# Patient Record
Sex: MALE | Race: WHITE
[De-identification: names, ages, dates, MRNs, and addresses within clinical notes are randomized per-mention and may not be internally consistent; named-entity substitution may affect disease eponyms.]

---

## 2019-09-10 ENCOUNTER — HOSPITAL ENCOUNTER (EMERGENCY)
Dept: HOSPITAL 97 - ER | Age: 34
Discharge: HOME | End: 2019-09-10
Payer: SELF-PAY

## 2019-09-10 VITALS — DIASTOLIC BLOOD PRESSURE: 71 MMHG | OXYGEN SATURATION: 99 % | SYSTOLIC BLOOD PRESSURE: 124 MMHG

## 2019-09-10 VITALS — TEMPERATURE: 98.9 F
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
## 2019-09-10 DIAGNOSIS — S01.01XA: Primary | ICD-10-CM
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
## 2019-09-10 DIAGNOSIS — Z23: ICD-10-CM

## 2019-09-10 PROCEDURE — 90471 IMMUNIZATION ADMIN: CPT

## 2019-09-10 PROCEDURE — 99284 EMERGENCY DEPT VISIT MOD MDM: CPT

## 2019-09-10 PROCEDURE — 90714 TD VACC NO PRESV 7 YRS+ IM: CPT

## 2019-09-10 PROCEDURE — 72125 CT NECK SPINE W/O DYE: CPT

## 2019-09-10 PROCEDURE — 70450 CT HEAD/BRAIN W/O DYE: CPT

## 2019-09-10 PROCEDURE — 0JQ00ZZ REPAIR SCALP SUBCUTANEOUS TISSUE AND FASCIA, OPEN APPROACH: ICD-10-PCS

## 2019-09-10 NOTE — EDPHYS
Physician Documentation                                                                           

 [facility]                                                                 

Name: Omar Quispe                                                                               

Age: 34 yrs                                                                                       

Sex: Male                                                                                         

: 1985                                                                                   

MRN: [id number]                                                                                   

Arrival Date: 09/10/2019                                                                          

Time: 11:39                                                                                       

Account#: [id number]                                                                            

Bed 15                                                                                            

Private MD:                                                                                       

ED Physician Philippe Renteria                                                                         

HPI:                                                                                              

09/10                                                                                             

11:54 This 34 yrs old  Male presents to ER via Ambulatory with complaints of         jmm 

      Laceration To Head.                                                                         

11:54 The patient or guardian reports injury. The complaints affect the left side of the back jmm 

      of head and right side of the back of head. Onset: The symptoms/episode began/occurred      

      acutely, just prior to arrival. Associated signs and symptoms: Loss of consciousness:       

      This patient did not experience any loss of consciousness. Pertinent negatives: neck        

      pain, shortness of breath, vomiting. Patient states a 55 gallon barrel fell on his head     

      at work. Denies vomiting, LOC, denies pain. Patient unsure on tetanus immunizations         

      status. .                                                                                   

                                                                                                  

Historical:                                                                                       

- Allergies:                                                                                      

11:53 No Known Allergies;                                                                     jl7 

- Home Meds:                                                                                      

11:53 None [Active];                                                                          jl7 

- PMHx:                                                                                           

11:53 Sleep Apnea;                                                                            jl7 

- PSHx:                                                                                           

11:53 None;                                                                                   jl7 

                                                                                                  

- Immunization history:: Adult Immunizations unknown, Last tetanus immunization:                  

  unknown.                                                                                        

- Social history:: Smoking status: Patient/guardian denies using tobacco.                         

- Ebola Screening: : No symptoms or risks identified at this time.                                

                                                                                                  

                                                                                                  

ROS:                                                                                              

11:54 Constitutional: Negative for fever, chills, and weight loss, Cardiovascular: Negative   jmm 

      for chest pain, palpitations, and edema, Respiratory: Negative for shortness of breath,     

      cough, wheezing, and pleuritic chest pain.                                                  

11:54 Skin: Positive for laceration(s).                                                           

11:54 Neuro: Negative for loss of consciousness.                                                  

11:54 All other systems are negative.                                                             

                                                                                                  

Exam:                                                                                             

11:54 Constitutional:  This is a well developed, well nourished patient who is awake, alert,  jmm 

      and in no acute distress.                                                                   

11:54 Eyes:  EOMI, no conjunctival erythema appreciated ENT:  Moist Mucus Membranes Neck:         

      Trachea midline, Supple Chest/axilla:  Normal chest wall appearance and motion.             

      Cardiovascular:  Regular rate and rhythm.  No edema appreciated Respiratory:  Normal        

      respirations, no respiratory distress appreciated Abdomen/GI:  Non distended, soft          

      Back:  Normal ROM                                                                           

11:54 Head/face: 6 cm laceration noted to the posterior scalp.                                    

11:54 Skin: 6 cm laceration noted to the posterior scalp.                                         

11:54 Neuro: Orientation: is normal, Mentation: is normal, Memory: is normal.                     

11:54 Psych: Behavior/mood is pleasant, cooperative.                                              

                                                                                                  

Vital Signs:                                                                                      

11:53  / 80; Pulse 64; Resp 16 S; Temp 98.9(O); Pulse Ox 98% on R/A; Weight 120.2 kg    jl7 

      (R); Pain 2/10;                                                                             

12:56  / 71; Pulse 63; Resp 17; Pulse Ox 99% on R/A;                                    mh5 

                                                                                                  

Laceration:                                                                                       

13:28 Wound Repair of 6cm ( 2.4in ) subcutaneous laceration to posterior scalp. Distal        jmm 

      neuro/vascular/tendon intact. Anesthesia: Local anesthetic administered with 5 mls of       

      1% lidocaine. Wound prep: Moderate cleansing with betadine by me. Skin closed with 5        

      1-0 Staples using staple gun. Patient tolerated well.                                       

                                                                                                  

MDM:                                                                                              

11:54 Patient medically screened.                                                             [facility] 

13:28 Data reviewed: vital signs, nurses notes. Counseling: I had a detailed discussion with  [facility] 

      the patient and/or guardian regarding: the historical points, exam findings, and any        

      diagnostic results supporting the discharge/admit diagnosis, radiology results, the         

      need for outpatient follow up, to return to the emergency department if symptoms worsen     

      or persist or if there are any questions or concerns that arise at home. ED course:         

      Patient given head injury return precautions. Patient understood and agrees with the        

      plan of care. .                                                                             

                                                                                                  

09/10                                                                                             

11:55 Order name: CT Head C Spine; Complete Time: 13:28                                       [facility] 

                                                                                                  

Administered Medications:                                                                         

12:31 Drug: Tetanus-Diphtheria Toxoid Adult 0.5 ml {: [employer]. Exp:         jl7 

      2021. Lot #: [id number]. } Route: IM; Site: right deltoid;                                 

12:46 Follow up: Response: No adverse reaction                                                 

13:06 Drug: Lidocaine (1 %) 5 mg {Note: administered by DHRUV Blevins} Volume: 5 ml; Route:       jl7 

      Infiltration;                                                                               

                                                                                                  

                                                                                                  

Disposition:                                                                                      

14:45 Co-signature as Attending Physician, Philippe Renteria MD.                                    rn  

                                                                                                  

Disposition:                                                                                      

09/10/19 13:30 Discharged to Home. Impression: Unspecified injury of head, Scalp Laceration.      

- Condition is Stable.                                                                            

- Discharge Instructions: Head Injury, Adult, Facial Laceration.                                  

                                                                                                  

- Medication Reconciliation Form, Thank You Letter, Antibiotic Education, Prescription            

  Opioid Use, Work release form form.                                                             

- Follow up: Private Physician; When: 5 - 6 days; Reason: Recheck today's complaints,             

  Continuance of care, Staple/Suture removal, Re-evaluation by your physician.                    

                                                                                                  

                                                                                                  

                                                                                                  

Signatures:                                                                                       

Dispatcher [employer]                           EDGen Cervantes PA PA jmm Nieto, Roman, MD MD   rn                                                   

Jas Hodges RN                        RN   jl7                                                  

                                                                                                  

Corrections: (The following items were deleted from the chart)                                    

13:50 13:30 09/10/2019 13:30 Discharged to Home. Impression: Unspecified injury of head;      jl7 

      Scalp Laceration. Condition is Stable. Forms are Medication Reconciliation Form, Thank      

      You Letter, Antibiotic Education, Prescription Opioid Use. Follow up: Private               

      Physician; When: 5 - 6 days; Reason: Recheck today's complaints, Continuance of care,       

      Staple/Suture removal, Re-evaluation by your physician. [email or web address]                                 

                                                                                                  

**************************************************************************************************

## 2019-09-10 NOTE — RAD REPORT
EXAM DESCRIPTION:  CT - [id number] - 9/10/2019 12:08 pm

 

CLINICAL HISTORY:  Blunt force trauma, head and neck injury

 

COMPARISON:  None.

 

TECHNIQUE:  Axial 5 mm thick images of the head were obtained.  Axial 2 mm thick images of the cervic
al spine were obtained with sagittal and coronal reconstruction images generated and reviewed.

 

All CT scans are performed using dose optimization technique as appropriate and may include automated
 exposure control or mA/KV adjustment according to patient size.

 

FINDINGS:  No intracranial hemorrhage, mass, edema or acute intracranial finding. No cortical edema o
r sulcal effacement. No extra-axial fluid collections. Mastoid air cells and paranasal sinuses are cl
ear. No globe or orbit abnormality seen. No skull fracture. No scalp hematoma identified.

 

Cervical body height and alignment are normal. No disk space narrowing. No fracture or acute bony abn
ormality.

 

No paraspinal mass or hematoma.

 

IMPRESSION:  Negative CT head examination for acute or significant finding.

 

Negative CT cervical spine examination for acute or significant finding.

## 2019-09-10 NOTE — ER
Nurse's Notes                                                                                     

 [facility]                                                                 

Name: Omar Quispe                                                                               

Age: 34 yrs                                                                                       

Sex: Male                                                                                         

: 1985                                                                                   

MRN: [id number]                                                                                   

Arrival Date: 09/10/2019                                                                          

Time: 11:39                                                                                       

Account#: [id number]                                                                            

Bed 15                                                                                            

Private MD:                                                                                       

Diagnosis: Unspecified injury of head;Scalp Laceration                                            

                                                                                                  

Presentation:                                                                                     

09/10                                                                                             

11:51 Presenting complaint: Patient states: At work and 55 gallon drum hit back of head,      jl7 

      denies LOC. Transition of care: patient was not received from another setting of care.      

      Complicating Factors: There are no complicating factors for this patient. Onset of          

      symptoms was September 10, 2019 at 11:20. Risk Assessment: Do you want to hurt yourself     

      or someone else? Patient reports no desire to harm self or others. Initial Sepsis           

      Screen: Does the patient meet any 2 criteria? No. Patient's initial sepsis screen is        

      negative. Does the patient have a suspected source of infection? No. Patient's initial      

      sepsis screen is negative. Care prior to arrival: None.                                     

11:51 Method Of Arrival: Ambulatory                                                           [facility] 

11:51 Acuity: ELENA 4                                                                           jl7 

                                                                                                  

Triage Assessment:                                                                                

11:53 General: Appears in no apparent distress. uncomfortable, Behavior is calm, cooperative, jl7 

      appropriate for age. Pain: Complains of pain in posterior scalp Pain currently is 2 out     

      of 10 on a pain scale. Neuro: Level of Consciousness is awake, alert, obeys commands,       

      Oriented to person, place, time, situation, Gait is steady, Speech is normal, Pupils        

      are PERRLA. Cardiovascular: Patient's skin is warm and dry. Respiratory: Airway is          

      patent Respiratory effort is even, unlabored, Respiratory pattern is regular,               

      symmetrical. Derm: Skin is pink, warm \T\ dry. Injury Description: Laceration sustained     

      to posterior scalp is 2.6 to 7.5 cm long, was sustained 30-60 minutes ago. is bleeding      

      a small amount.                                                                             

                                                                                                  

Historical:                                                                                       

- Allergies:                                                                                      

11:53 No Known Allergies;                                                                     jl7 

- Home Meds:                                                                                      

11:53 None [Active];                                                                          jl7 

- PMHx:                                                                                           

11:53 Sleep Apnea;                                                                            jl7 

- PSHx:                                                                                           

11:53 None;                                                                                   jl7 

                                                                                                  

- Immunization history:: Adult Immunizations unknown, Last tetanus immunization:                  

  unknown.                                                                                        

- Social history:: Smoking status: Patient/guardian denies using tobacco.                         

- Ebola Screening: : No symptoms or risks identified at this time.                                

                                                                                                  

                                                                                                  

Screenin:00 Abuse screen: Denies threats or abuse. Denies injuries from another. Nutritional        jl7 

      screening: No deficits noted. Tuberculosis screening: No symptoms or risk factors           

      identified. Fall Risk None identified.                                                      

                                                                                                  

Assessment:                                                                                       

12:00 General: See triage assessment. Musculoskeletal: No signs and/or symptoms reported      jl7 

      regarding the musculoskeletal system. Injury Description: Laceration sustained to           

      posterior scalp is 2.6 to 7.5 cm long, was sustained 30-60 minutes ago. is bleeding a       

      small amount.                                                                               

13:00 Reassessment: Patient appears in no apparent distress at this time. Patient and/or      jl7 

      family updated on plan of care and expected duration. Pain level reassessed. Patient is     

      alert, oriented x 3, equal unlabored respirations, skin warm/dry/pink.                      

                                                                                                  

Vital Signs:                                                                                      

11:53  / 80; Pulse 64; Resp 16 S; Temp 98.9(O); Pulse Ox 98% on R/A; Weight 120.2 kg    jl7 

      (R); Pain 2/10;                                                                             

12:56  / 71; Pulse 63; Resp 17; Pulse Ox 99% on R/A;                                    mh5 

                                                                                                  

ED Course:                                                                                        

11:39 Patient arrived in ED.                                                                  rg4 

11:44 Jas Hodges, RN is Primary Nurse.                                                      jl7 

11:46 Gen Crawley PA is PHCP.                                                              [facility] 

11:46 Philippe Renteria MD is Attending Physician.                                                [facility] 

11:53 Triage completed.                                                                       jl7 

11:53 Arm band placed on right wrist. Bandage applied.                                        jl7 

12:39 CT Head C Spine In Process Unspecified.                                                 EDMS

12:56 Patient has correct armband on for positive identification. Bed in low position. Call   5 

      light in reach. Pulse ox on. NIBP on.                                                       

13:00 Assist provider with laceration repair on back of head that was between 2.6 to 7.5 cm   jl7 

      using staples. Set up tray. Performed by Gen VELAZQUEZ Dressed with 4X4s, Patient         

      tolerated well. Patient did not have IV access during this emergency room visit.            

                                                                                                  

Administered Medications:                                                                         

12:31 Drug: Tetanus-Diphtheria Toxoid Adult 0.5 ml {: [employer]. Exp:         jl7 

      2021. Lot #: [id number]. } Route: IM; Site: right deltoid;                                 

12:46 Follow up: Response: No adverse reaction                                                jl7 

13:06 Drug: Lidocaine (1 %) 5 mg {Note: administered by CAROLINE Blevins.} Volume: 5 ml; Route:       jl7 

      Infiltration;                                                                               

                                                                                                  

                                                                                                  

Outcome:                                                                                          

13:30 Discharge ordered by MD. r[place]dale 

13:49 Discharged to home ambulatory.                                                          jl7 

13:50 Condition: stable                                                                       jl7 

13:50 Discharge instructions given to patient, Instructed on discharge instructions, follow       

      up and referral plans. Demonstrated understanding of instructions, follow-up care.          

13:50 Patient left the ED.                                                                    jl7 

                                                                                                  

Signatures:                                                                                       

Dispatcher [employer]                           EDMS                                                 

Gen Crawley PA PA jmm Garcia, Rubi                                 4                                                  

Carly Oneal                              [facility]                                                  

Jas Hodges RN                        RN   jl7                                                  

                                                                                                  

Corrections: (The following items were deleted from the chart)                                    

13:50 13:00 Discharged to home ambulatory, jl7                                                jl7 

13:50 13:00 Condition: stable jl7                                                             jl7 

13:50 13:00 Discharge instructions given to patient, Instructed on discharge instructions,    jl7 

      follow up and referral plans. Demonstrated understanding of instructions, follow-up         

      care, jl7                                                                                   

                                                                                                  

**************************************************************************************************

## 2019-09-16 ENCOUNTER — HOSPITAL ENCOUNTER (EMERGENCY)
Dept: HOSPITAL 97 - ER | Age: 34
Discharge: HOME | End: 2019-09-16
Payer: SELF-PAY

## 2019-09-16 VITALS — OXYGEN SATURATION: 96 % | SYSTOLIC BLOOD PRESSURE: 132 MMHG | DIASTOLIC BLOOD PRESSURE: 68 MMHG | TEMPERATURE: 97.9 F

## 2019-09-16 DIAGNOSIS — Z48.02: Primary | ICD-10-CM

## 2019-09-16 PROCEDURE — 99281 EMR DPT VST MAYX REQ PHY/QHP: CPT

## 2019-09-16 NOTE — EDPHYS
Physician Documentation                                                                           

 [facility]                                                                 

Name: Omar Quispe                                                                               

Age: 34 yrs                                                                                       

Sex: Male                                                                                         

: 1985                                                                                   

MRN: [id number]                                                                                   

Arrival Date: 2019                                                                          

Time: 16:57                                                                                       

Account#: [id number]                                                                            

Bed Waiting                                                                                       

Private MD:                                                                                       

RUSSEL Physician Gavin Rodriguez                                                                      

HPI:                                                                                              

                                                                                             

06:43 This 34 yrs old  Male presents to ER via Ambulatory with complaints of Staple  snw 

      Removal.                                                                                    

06:43 The patient has staples on the right parietal area. Previous treatment: The patient was snw 

      initially treated 7 day(s) ago, the care was rendered at [facility]. Sutures/staples progress: The patient's wound displays overriding edges. It is      

      unknown whether or not the patient has had similar symptoms in the past. as noted.          

                                                                                                  

Historical:                                                                                       

- Allergies:                                                                                      

                                                                                             

17:02 No Known Allergies;                                                                     sv  

- PMHx:                                                                                           

17:02 Sleep Apnea;                                                                            sv  

                                                                                                  

- Immunization history:: Adult Immunizations up to date.                                          

- Social history:: Smoking status: Patient/guardian denies using tobacco.                         

- Ebola Screening: : No symptoms or risks identified at this time.                                

                                                                                                  

                                                                                                  

ROS:                                                                                              

                                                                                             

06:40 Eyes: Negative for injury, pain, redness, and discharge, ENT: Negative for injury,      snw 

      pain, and discharge, Neck: Negative for injury, pain, and swelling, Cardiovascular:         

      Negative for chest pain, palpitations, and edema, Respiratory: Negative for shortness       

      of breath, cough, wheezing, and pleuritic chest pain, Abdomen/GI: Negative for              

      abdominal pain, nausea, vomiting, diarrhea, and constipation, Back: Negative for injury     

      and pain, : Negative for injury, bleeding, discharge, and swelling, MS/Extremity:         

      Negative for injury and deformity, Skin: Negative for injury, rash, and discoloration,      

      Neuro: Negative for headache, weakness, numbness, tingling, and seizure, Psych:             

      Negative for depression, anxiety, suicide ideation, homicidal ideation, and                 

      hallucinations.                                                                             

      Constitutional: Positive for no c/o, requests staple removal.                               

                                                                                                  

Exam:                                                                                             

06:40 Constitutional:  This is a well developed, well nourished patient who is awake, alert,  snw 

      and in no acute distress. Eyes:  Pupils equal round and reactive to light, extra-ocular     

      motions intact.  Lids and lashes normal.  Conjunctiva and sclera are non-icteric and        

      not injected.  Cornea within normal limits.  Periorbital areas with no swelling,            

      redness, or edema. ENT:  Nares patent. No nasal discharge, no septal abnormalities          

      noted.  Tympanic membranes are normal and external auditory canals are clear.               

      Oropharynx with no redness, swelling, or masses, exudates, or evidence of obstruction,      

      uvula midline.  Mucous membranes moist. Neck:  Trachea midline, no thyromegaly or           

      masses palpated, and no cervical lymphadenopathy.  Supple, full range of motion without     

      nuchal rigidity, or vertebral point tenderness.  No Meningismus. Chest/axilla:  Normal      

      chest wall appearance and motion.  Nontender with no deformity.  No lesions are             

      appreciated. Cardiovascular:  Regular rate and rhythm with a normal S1 and S2.  No          

      gallops, murmurs, or rubs.  Normal PMI, no JVD.  No pulse deficits. Respiratory:  Lungs     

      have equal breath sounds bilaterally, clear to auscultation and percussion.  No rales,      

      rhonchi or wheezes noted.  No increased work of breathing, no retractions or nasal          

      flaring. Abdomen/GI:  Soft, non-tender, with normal bowel sounds.  No distension or         

      tympany.  No guarding or rebound.  No evidence of tenderness throughout. Back:  No          

      spinal tenderness.  No costovertebral tenderness.  Full range of motion. Skin:  Warm,       

      dry with normal turgor.  Normal color with no rashes, no lesions, and no evidence of        

      cellulitis.                                                                                 

06:40 Head/face: Noted is staples x 6 to occiput, wound edges over-riding, still loosely          

      approximated. Cleansed with Hibiclens.                                                      

                                                                                                  

Vital Signs:                                                                                      

                                                                                             

17:02  / 68; Pulse 67; Resp 16; Temp 97.9; Pulse Ox 96% ;                               sv  

                                                                                                  

MDM:                                                                                              

17:13 Patient medically screened.                                                             snw 

                                                                                             

06:42 Data reviewed: vital signs, nurses notes. Data interpreted: Pulse oximetry: on room air snw 

      is 96 %. Interpretation: acceptable. Counseling: I had a detailed discussion with the       

      patient and/or guardian regarding: the historical points, exam findings, and any            

      diagnostic results supporting the discharge/admit diagnosis, to return to the emergency     

      department if symptoms worsen or persist or if there are any questions or concerns that     

      arise at home. Response to treatment: the patient's symptoms have markedly improved         

      after treatment. Special discussion: I discussed in detail with the patient the higher      

      chance of wound infection based on his presenting history. Based on the history and         

      exam findings, there is no indication for further emergent testing or inpatient             

      evaluation. I discussed with the patient/guardian the need to see the primary care          

      provider for further evaluation of the symptoms.                                            

                                                                                                  

Administered Medications:                                                                         

No medications were administered                                                                  

                                                                                                  

                                                                                                  

Disposition:                                                                                      

07:38 Co-signature as Attending Physician, Gavin Rodriguez MD I agree with the assessment and  tali 

      plan of care.                                                                               

                                                                                                  

Disposition:                                                                                      

19 17:13 Discharged to Home. Impression: Encounter for removal of sutures.                  

- Condition is Stable.                                                                            

- Discharge Instructions: How to Change Your Dressing, Suture Removal, Care After,                

  Incision Care, Adult, Wound Closure Removal.                                                    

                                                                                                  

- Medication Reconciliation Form, Thank You Letter, Antibiotic Education, Prescription            

  Opioid Use form.                                                                                

- Follow up: Private Physician; When: 2 - 3 days; Reason: Recheck today's complaints,             

  Continuance of care, Re-evaluation by your physician. Follow up: Emergency                      

  Department; When: As needed; Reason: Worsening of condition.                                    

                                                                                                  

                                                                                                  

                                                                                                  

Signatures:                                                                                       

Savannah Cloud RN RN sv Anderson, Corey, MD MD cha Therrien, Shelly, FNP-C                 FNP-Csnw                                                  

                                                                                                  

Corrections: (The following items were deleted from the chart)                                    

                                                                                             

17:17 17:13 2019 17:13 Discharged to Home. Impression: Encounter for removal of         sv  

      sutures. Condition is Stable. Forms are Medication Reconciliation Form, Thank You           

      Letter, Antibiotic Education, Prescription Opioid Use. Follow up: Private Physician;        

      When: 2 - 3 days; Reason: Recheck today's complaints, Continuance of care,                  

      Re-evaluation by your physician. Follow up: Emergency Department; When: As needed;          

      Reason: Worsening of condition. snw                                                         

                                                                                                  

**************************************************************************************************

## 2019-09-16 NOTE — XMS REPORT
Summary of Care: 17 - 17

 Created on:2122



Patient:LORENA MORALES

Sex:Male

:1985

External Reference #:[id number]





Demographics







 Address  [address] [place] [address]-

 

 Home Phone  [phone]

 

 Email Address  [email or web address]

 

 Preferred Language  English

 

 Marital Status  Single

 

 Jainism Affiliation  Unknown

 

 Race  White/

 

 Ethnic Group  Not  or 









Author







 Organization  [facility]

 

 Address  [address] [place] [address]-

 

 Phone  [phone]









Encounter

HQ Encntr_alias(FIN) [id number] Date(s): 17 - 17

[facility] [address] 
[id number]- [phone]

Discharge Disposition: Home or Self Care

Attending Physician: Milagros Gamez NP





Vital Signs

No data available for this section



Problem List







 Condition  Effective Dates  Status  Health Status  Informant

 

 Asthma(Confirmed)    Active    

 

 Chronic urticaria(Confirmed)    Active    

 

 Mixed hyperlipidemia(Confirmed)    Active    

 

 Morbid obesity(Confirmed)    Active    

 

 Sleep apnea(Confirmed)    Active    







Allergies, Adverse Reactions, Alerts







 Substance  Reaction  Severity  Status

 

 NKDA      Active







Medications

No data available for this section



Results

No data available for this section



Immunizations

No data available for this section



Procedures

No data available for this section



Social History







 Social History Type  Response

 

 Smoking Status  Never smoker; Exposure to Tobacco Smoke None; Cigarette Smoking



   Last 365 Days No; Reg Smoking Cessation Counseling No







Assessment and Plan

No data available for this section

## 2019-09-16 NOTE — XMS REPORT
Summary of Care

 Created on:2019



Patient:Omar Quispe

Sex:Male

:1985

External Reference #:[id number]





Demographics







 Address  [address] [place] [address]

 

 Home Phone  [phone]

 

 Preferred Language  English

 

 Marital Status  

 

 Jain Affiliation  Unknown

 

 Race  White

 

 Ethnic Group  Not  or 









Author







 Organization  [facility]

 

 Address  [address] [place] [address]









Support







 Name  Relationship  Address  Phone

 

 Dexter Quispe  Unavailable  Unavailable  [phone]









Care Team Providers







 Name  Role  Phone

 

 Saud Duomnt MD  Primary Care Provider  [phone]









Reason for Visit







 Reason  Comments

 

 New Patient  



 (ASAP)





 Status  Reason  Specialty  Diagnoses /  Referred By  Referred To



       Procedures  Contact  Contact

 

 Authorized    Orthopedic Surgery  Diagnoses



Paronychia of great toe, right



Injury of toenail of right foot, initial encounter  Nasir Martinez  



       



Procedures



CONSULT/REFERRAL PODIATRY  [facility]



  



         [address] [place]  



         [address]



  



         Phone:  



         [phone]



  



         Fax:  



         [phone]  









Encounter Details







 Date  Type  Department  Care Team  Description

 

 2019  Office Visit  [facility]  Panchbhavi,  Plantar 
fasciitis



     Surgery- Abigail Barr MD



  (Primary Dx)



     [facility]



  [address] [place] [address]  



     [address]



  [address] [place] [address] [phone]  



       (Fax)  







Allergies







 Active Allergy  Reactions  Severity  Noted Date  Comments

 

 Testosterone  Nausea and/or Vomiting    2019  



documented as of this encounter (statuses as of 2019)



Medications







 Medication  Sig  Dispensed  Refills  Start Date  End Date  Status

 

 mupirocin 2 %  Apply  to area(s)  22 g  0  2019    Active



 ointmentIndications:  3 (three) times          



 Paronychia of great  daily.          



 toe, right            



documented as of this encounter (statuses as of 2019)



Active Problems

No known active problemsdocumented as of this encounter (statuses as of 2019)



Social History







 Tobacco Use  Types  Packs/Day  Years Used  Date

 

 Unknown If Ever Smoked        









 Smokeless Tobacco: Never Used      









 Sex Assigned at Birth  Date Recorded

 

 Not on file  









 Job Start Date  Occupation  Industry

 

 Not on file  Not on file  Not on file









 Travel History  Travel Start  Travel End









 No recent travel history available.



documented as of this encounter



Last Filed Vital Signs







 Vital Sign  Reading  Time Taken  Comments

 

 Blood Pressure  -  -  

 

 Pulse  -  -  

 

 Temperature  -  -  

 

 Respiratory Rate  -  -  

 

 Oxygen Saturation  -  -  

 

 Inhaled Oxygen Concentration  -  -  

 

 Weight  120.4 kg (265 lb 6.4 oz)  2019  3:01 PM  



     CDT  

 

 Height  -  -  

 

 Body Mass Index  41.57  2019  6:13 PM  



     CDT  



documented in this encounter



Progress Notes

Efraín Doe,  - 2019  2:20 PM CDTFormatting of this note might 
be different from the original.

ORTHOPAEDIC CLINIC NOTE



Patient: Omar Quispe

Patient Age: 34 year old

Gender: male



SUBJECTIVE:



CC: right "loose" toenail



HISTORY OF PRESENT ILLNESS

Omar Quispe is a 34 year old male who presents with right sided loose 
toenail after hitting it on a rock about 3 weeks ago. He also complaints of 
bilateral plantar tenderness that is worse in the morning when he takes his 
first few steps



Lives in [address] [place] [address].



PAST HISTORIES

No significant PMH

NO significant PSH



MEDICATIONS

Current Outpatient Medications

Medication Sig Dispense Refill

 mupirocin 2 % ointment Apply  to area(s) 3 (three) times daily. 22 g 0



No current facility-administered medications for this visit.



ALLERGIES

Allergies

Allergen Reactions

 Androgel [Testosterone] Nausea and/or Vomiting



SOCIAL

Social History



Occupational History

 Not on file

Tobacco Use

 Smoking status: Unknown If Ever Smoked

 Smokeless tobacco: Never Used

Substance and Sexual Activity

 Alcohol use: Not on file

 Drug use: Not on file

 Sexual activity: Not on file



REVIEW OF SYSTEMS

Pertinent ROM can be found in HPI



OBJECTIVE:



PHYSICAL EXAMINATION

CONSTITUTIONAL:

Wt 120.4 kg (265 lb 6.4 oz)  | BMI 41.57 kg/m

Constitutional: No acute distress, appears adequately nurished

Eyes: extraocular movements intact, conjunctivae pink

ENMT: normal hearing, trachea midline, ears symmetrical

CV: normal peripheral perfusion, regular rate

Respiratory: breaths nonlabored, symmetrical chest expansion, no respiratory 
distress

GI: abdomen non-distended, no gross deformity

Skin: warm and dry, no pallor

Neurologic: no focal deficits, vocalizing demands

Psychiatric: A&amp;OX3; Appropriate affect

Musculoskeletal:

Loose 1st toe nail.

Grossly NV intact

Motor intact to EHL and FHL.

Minimal to no tenderness

TTP over the



IMAGING

Xray imaging reviewed and supports the diagnosis below



LABS

Laboratory analysis not indicated for current encounter



 Independently reviewed current and previous imaging for comparison, if 
applicable, as well as relevant previous or current lab results. Physical exam 
finding have been adequately explained to the patient



ASSESSMENT AND PLAN



ASSESSMENT

Omar Quispe is a 34 year old /White male  with loose toenail on 
the right foot; no concern for paronychia; Bilateral plantar fasciitis



PLAN:

We have discussed all the treatment options and have agreed upon:



-no concern for infection; may allow nail to fall off naturally; new nail with 
replace the current nail

-Follow up PRN

-Wall stretching exercise for calf stretching

-Cushioned heel pads

-Ball roll stretching exercises discussed with patient

-OTC pain medication for acute flares of pain

-Recommend changing shoes for work every three months



Spent time going over patient's symptoms, physical exam findings, imaging 
findings, and treatment plan.I discussed my findings and educated patient on 
the condition present. All questions were answeredappropriately and to the 
patient's satisfaction.



Efraín Doe DO

Orthopaedics Department

PGY-2 Pager: [phone]

2019





Electronically signed by Efraín Doe DO at 2019  3:13 PM 
Reanna Loving - 2019  2:20 PM Donovan BETO Quispe is a 34 year old 
male comes to clinic independent in ambulation as a new patient.

Pt comes alone.

No acute distress noted w/ pain reported 0/10.

Pt preferred language is English.

Pt. denies fall in last 12 months.

Allergies and medications were reviewed.

Electronically signed by Reanna Esparza at 2019  3:02 PM CDTdocumented 
in this encounter



Plan of Treatment







 Health Maintenance  Due Date  Last Done  Comments

 

 VARICELLA VACCINES (1 of 2 - 13+  1998    



 2-dose series)      

 

 DTaP,Tdap,and Td Vaccines ( -  2004    



 Tdap)      

 

 INFLUENZA VACCINE (#1)  2019    

 

 PNEUMOCOCCAL 0-64 YEARS COMBINED  Aged Out    No longer eligible based on



 SERIES      patient's age to complete this



       topic



documented as of this encounter



Results

Not on filedocumented in this encounter



Visit Diagnoses







 Diagnosis

 

 Plantar fasciitis - Primary







 Plantar fascial fibromatosis



documented in this encounter



Insurance







 Payer  Benefit Plan / Group  Subscriber ID  Effective Dates  Phone  Address  
Type

 

 [facility] [employer]  [facility] [employer]  [id number]  2019-Present      PPO









 Guarantor Name  Account Type  Relation to  Date of  Phone  Billing Address



     Patient  Birth    

 

 Omar Quispe  Personal/Famil  Self  1985  [phone]  [address] 
[facility]      ([place] [address]



documented as of this encounter

## 2019-09-16 NOTE — XMS REPORT
Summary of Care

 Created on:2019



Patient:Omar Quispe

Sex:Male

:1985

External Reference #:[id number]





Demographics







 Address  [address] [place] [address]

 

 Home Phone  [phone]

 

 Preferred Language  English

 

 Marital Status  

 

 Yarsanism Affiliation  Unknown

 

 Race  White

 

 Ethnic Group  Not  or 









Author







 Organization  [facility]

 

 Address  [address] [place] [address]









Support







 Name  Relationship  Address  Phone

 

 Dexter Quispe  Unavailable  Unavailable  [phone]









Care Team Providers







 Name  Role  Phone

 

 Saud Dumont MD  Primary Care Provider  [phone]









Reason for Referral

 (ASAP)





 Status  Reason  Specialty  Diagnoses /  Referred By  Referred To



       Procedures  Contact  Contact

 

 New Request    Orthopedic Surgery  Diagnoses



Paronychia of great toe, right



Injury of toenail of right foot, initial encounter  Nasir Martinez  



       



Procedures



CONSULT/REFERRAL PODIATRY  MD ISMAEL



  



         [address] [place] [address]  



         [address]



  



         Phone:  



         [phone]



  



         Fax:  



         [phone]  









Reason for Visit







 Reason  Comments

 

 Toe Pain  RT great toe - hit rock while tubing and nail is lifted







Encounter Details







 Date  Type  Department  Care Team  Description

 

 2019  Urgent Care  [facility]  Unknown, Attending  Paronychia 
of great toe, right (Primary Dx);



     and Adult Primary  



Nasir Martinez MD



[address] [place] [address] [phone] (Fax)  Injury of toenail of right foot, initial encounter



     [address] [address] [phone]    







Allergies







 Active Allergy  Reactions  Severity  Noted Date  Comments

 

 Testosterone  Nausea and/or Vomiting    2019  



documented as of this encounter (statuses as of 2019)



Medications







 Medication  Sig  Dispensed  Refills  Start Date  End Date  Status

 

 sulfamethoxazole-trim  Take 1 tablet by  20 tablet  0  2019  
Active



 ethoprim (BACTRIM DS)  mouth 2 (two)          



 800-160 mg per  times daily for          



 tabletIndications:  10 days.          



 Paronychia of great            



 toe, right            

 

 mupirocin 2 %  Apply  to area(s)  22 g  0  2019    Active



 ointmentIndications:  3 (three) times          



 Paronychia of great  daily.          



 toe, right            



documented as of this encounter (statuses as of 2019)



Active Problems

No known active problemsdocumented as of this encounter (statuses as of 2019)



Social History







 Tobacco Use  Types  Packs/Day  Years Used  Date

 

 Unknown If Ever Smoked        









 Smokeless Tobacco: Never Used      









 Sex Assigned at Birth  Date Recorded

 

 Not on file  









 Job Start Date  Occupation  Industry

 

 Not on file  Not on file  Not on file









 Travel History  Travel Start  Travel End









 No recent travel history available.



documented as of this encounter



Last Filed Vital Signs







 Vital Sign  Reading  Time Taken  Comments

 

 Blood Pressure  125/81  2019  6:13 PM  



     CDT  

 

 Pulse  66  2019  6:13 PM  



     CDT  

 

 Temperature  36.8 C (98.3 F)  2019  6:13 PM  



     CDT  

 

 Respiratory Rate  18  2019  6:13 PM  



     CDT  

 

 Oxygen Saturation  97%  2019  6:13 PM  



     CDT  

 

 Inhaled Oxygen Concentration  -  -  

 

 Weight  121.1 kg (266 lb 14.4 oz)  2019  6:13 PM  



     CDT  

 

 Height  170.2 cm (5' 7")  2019  6:13 PM  



     CDT  

 

 Body Mass Index  41.8  2019  6:13 PM  



     CDT  



documented in this encounter



Patient Instructions

Patient InstructionsWhNasir jasso MD - 2019  6:15 PM CDTsupportive 
care with plenty of fluids; tylenol or motrin prn. F/u with PCP within 1 week 
or sooner if symptoms not improved or worsen.

Thank you.Electronically signed by Nasir Martinez MD at 2019  6:58 PM 
CDT

documented in this encounter



Progress Notes

Nasir Martinez MD - 2019  6:15 PM CDTFormatting of this note might be 
different from the original.

Cc: right great toe injury



Chief Complaint

Patient presents with

 Toe Pain

  RT great toe - hit rock while tubing and nail is lifted



Omar Quispe is a 34 year old male.

Patient reports right great toe injury, along with toenail injury that occurred 
recently while patient was tubing in the [facility]. Patient reports pain
, swelling, and redness to right great toe, along with partial nail avulsion. 
Patient denies numbness or weakness. Some pain with ambulation also reported.



Allergies

Omar is allergic to androgel [testosterone].



Medications

No outpatient medications prior to visit.



No facility-administered medications prior to visit.



Histories

History reviewed. No pertinent past medical history.

History reviewed. No pertinent surgical history.

Social History



Socioeconomic History

 Marital status: 

  Spouse name: Not on file

 Number of children: Not on file

 Years of education: Not on file

 Highest education level: Not on file

Occupational History

 Not on file

Social Needs

 Financial resource strain: Not on file

 Food insecurity:

  Worry: Not on file

  Inability: Not on file

 Transportation needs:

  Medical: Not on file

  Non-medical: Not on file

Tobacco Use

 Smoking status: Unknown If Ever Smoked

 Smokeless tobacco: Never Used

Substance and Sexual Activity

 Alcohol use: Not on file

 Drug use: Not on file

 Sexual activity: Not on file

Lifestyle

 Physical activity:

  Days per week: Not on file

  Minutes per session: Not on file

 Stress: Not on file

Relationships

 Social connections:

  Talks on phone: Not on file

  Gets together: Not on file

  Attends Yazidi service: Not on file

  Active member of club or organization: Not on file

  Attends meetings of clubs or organizations: Not on file

  Relationship status: Not on file

 Intimate partner violence:

  Fear of current or ex partner: Not on file

  Emotionally abused: Not on file

  Physically abused: Not on file

  Forced sexual activity: Not on file

Other Topics Concern

 Not on file

Social History Narrative

 Not on file



History reviewed. No pertinent family history.



Review of Systems

Constitutional: Negative for activity change, appetite change, chills, 
diaphoresis, fatigue and fever.

HENT: Negative for congestion, dental problem, rhinorrhea, sinus pressure and 
sore throat.

Eyes: Negative for pain and visual disturbance.

Respiratory: Negative for cough, shortness of breath, wheezing and stridor.

Cardiovascular: Negative for chest pain and palpitations.

Gastrointestinal: Negative for abdominal distention, constipation, diarrhea, 
nausea and vomiting.

Genitourinary: Negative for dysuria and frequency.

Musculoskeletal: Negative for arthralgias, back pain and neck pain.

Skin: Positive for color change. Negative for pallor.

Neurological: Negative for dizziness, tremors, weakness, light-headedness, 
numbness and headaches.

Hematological: Negative for adenopathy.



Vital Signs

/81 (BP Location: Left arm, Patient Position: Sitting, BP CUFF SIZE: 
Adult Large)  | Pulse 66| Temp 36.8 C (98.3 F) (Oral)  | Resp 18  | Ht 5' 7
" (1.702 m)  | Wt 266 lb 14.4 oz (121.1 kg)  | SpO2 97%  | BMI 41.80 kg/m



Physical Exam

Constitutional: He is oriented to person, place, and time. He appears well-
developed and well-nourished. No distress.

HENT:

Head: Normocephalic and atraumatic.

Right Ear: External ear normal.

Left Ear: External ear normal.

Eyes: Pupils are equal, round, and reactive to light. Conjunctivae and EOM are 
normal. Right eye exhibits no discharge. Left eye exhibits no discharge.

Neck: Normal range of motion. Neck supple. No tracheal deviation present.

Cardiovascular: Normal rate and intact distal pulses.

Pulmonary/Chest: Effort normal. No respiratory distress.

Abdominal: Soft. He exhibits no distension.

Musculoskeletal: Normal range of motion. He exhibits edema and tenderness. He 
exhibits no deformity.

Neurological: He is alert and oriented to person, place, and time. He has 
normal reflexes. No cranial nerve deficit or sensory deficit. He exhibits 
normal muscle tone.

Skin: Skin is warm and dry. Capillary refill takes less than 2 seconds. He is 
not diaphoretic. Thereis erythema. No pallor.

Psychiatric: He has a normal mood and affect. His behavior is normal.

Nursing note and vitals reviewed.



Assessment/Plan



Paronychia of great toe, right  (primary encounter diagnosis)

Comment: right great toe paronychia and partly avulsed nail plate secondary to 
right great toe injury. Good distal pulses and capillary refill. Patient 
afebrile in NAD. Motor and sensory function intact.

Plan: sulfamethoxazole-trimethoprim (BACTRIM DS) 800-160 mg per tablet, CONSULT/
REFERRAL PODIATRY, mupirocin 2 % ointment; warm epsom salt soak prn; warm 
compress prn; supportive care with plenty of fluids; tylenol or motrin prn. F/u 
with PCP within 1 week or sooner if symptoms not improved or worsen.



Injury of toenail of right foot, initial encounter

Comment: as above.

Plan: CONSULT/REFERRAL PODIATRY; plan as above.



The pt expressed understanding and agreed with the plan of care.



Nasir Martinez MD

Family Medicine

Electronically signed by Nasir Martinez MD at 2019  4:47 AM 
CDTdocumented in this encounter



Plan of Treatment







 Health Maintenance  Due Date  Last Done  Comments

 

 VARICELLA VACCINES (1 of 2 - 13+  1998    



 2-dose series)      

 

 DTaP,Tdap,and Td Vaccines (1 -  2004    



 Tdap)      

 

 INFLUENZA VACCINE (#1)  2019    

 

 PNEUMOCOCCAL 0-64 YEARS COMBINED  Aged Out    No longer eligible based on



 SERIES      patient's age to complete this



       topic



documented as of this encounter



Results

Not on filedocumented in this encounter



Visit Diagnoses







 Diagnosis

 

 Paronychia of great toe, right - Primary







 Onychia and paronychia of toe

 

 Injury of toenail of right foot, initial encounter



documented in this encounter



Insurance







 Payer  Benefit Plan / Group  Subscriber ID  Effective Dates  Phone  Address  
Type

 

 [facility] NETWORK  [facility] GENERIC  [id number]  2019-Present      PPO









 Guarantor Name  Account Type  Relation to  Date of  Phone  Billing Address



     Patient  Birth    

 

 Omar Quispe  Personal/Famil  Self  1985  [phone]  [address] 
[facility]      ([place])  [place] [address]



documented as of this encounter

## 2019-09-16 NOTE — XMS REPORT
Continuity of Care Document

 Created on:2019



Patient:LORENA MORALES

Sex:Male

:1985

External Reference #:[id number]





Demographics







 Address  [address] [place] [address]

 

 Home Phone  [phone]

 

 Preferred Language  en-US

 

 Marital Status  Unknown

 

 Mormon Affiliation  Unknown

 

 Race  Unknown

 

 Ethnic Group  Unknown









Author







 Organization  [employer] [facility]









Care Team Providers







 Name  Role  Phone

 

 [employer]  Unavailable  Unavailable









Problems







 Problem  Status  Onset  Classification  Date  Comments  Source



     Date    Reported    



             



             



             

 

 M79.672 - PAIN IN  Active  20        [facility]



 LEFT FOOT    17        [place]



             



             

 

 Asthma (disorder)  Active    Problem  2017    [facility]



             [facility]



             



             

 

 Chronic urticaria  Active    Problem  2017    [facility]



 (disorder)            [place]



             



             

 

 Mixed  Active    Problem  2017    [facility]



 hyperlipidemia            [place]



 (disorder)            



             

 

 Morbid obesity  Active    Problem  2017    [facility]



 (disorder)            [place]



             



             

 

 Sleep apnea  Active    Problem  2017     [facility]



 (finding)            [place]



             



             







Medications







 No Data Provided for This Section







Allergies, Adverse Reactions, Alerts







 No Known Medication Allergies







Immunizations







 No Data Provided for This Section







Results







 No Data Provided for This Section







Pathology Reports







 No Data Provided for This Section







Diagnostic Reports







 Report  Value  Date  Source



       

 

 Foot 2 views DX  LEFT FOOT RADIOGRAPH 3 VIEW  2017   [facility] [place]



   CLINICAL INDICATION: Posttraumatic left heel pain - yesterday jumping on 
trampoline and felt a pop of base of left heel    



   COMPARISON: None    



   IMPRESSION:    



   No acute bony abnormalities are visualized.    



   Mild spurring is noted at the superior aspect of the calcaneal tuberosity. 
The shadow of the Achilles tendon appears grossly intact. Minimal edema is seen 
in the region of the Kager fat pad. The soft ti    



   ssues are otherwise radiographically unremarkable.    



   The joint spaces are maintained.    



       



       



   SL:16    



       







Consultation Notes







 No Data Provided for This Section







Discharge Summaries







 No Data Provided for This Section







History and Physicals







 No Data Provided for This Section







Vital Signs







 No Data Provided for This Section







Encounters







 Location  Location  Encounter  Encounter  Reason  Attending  ADM  DC  Status  
Source



   Details  Type  Number  For  Provider  Date  Date    



         Visit          



                   



                   



                   

 

     Outpatient  [id number]    YUSRA      [facility]



                 [place]



                   



                   



                   



                   

 

     Outpatient  [id number]    JORGE      Active  [facility]



           NICOLE        [place]



                   



                   



                   



                   

 

     Outpatient  [id number]    JORGE      [facility]



           NICOLE        [place]



                   



                   



                   



                   

 

     Outpatient  [id number]    JORGE      [facility]



                 [facility]    Outpt Diag  [id number]    Jorge       [facility]



 Outpatient    Services          [facility]



 Imaging                  ood



 [place]                  



                   



                   







Procedures







 No Data Provided for This Section







Assessment and Plan







 No Data Provided for This Section







Plan of Care







 No Data Provided for This Section







Social History







 Social History  Date  Source



     

 

 Social History TypeResponse  2017   [facility] [place]



 Smoking Status    



 Never smoker; Exposure to Tobacco Smoke None; Cigarette Smoking Last 365 Days 
No; Reg Smoking Cessation Counseling No    



     







Family History







 No Data Provided for This Section







Advance Directives







 No Data Provided for This Section







Functional Status







 No Data Provided for This Section

## 2019-09-16 NOTE — XMS REPORT
Summary of Care

 Created on:2019



Patient:Omar Quispe

Sex:Male

:1985

External Reference #:[id number]





Demographics







 Address  [address] [place] [address]

 

 Home Phone  [phone]

 

 Preferred Language  English

 

 Marital Status  

 

 Mu-ism Affiliation  Unknown

 

 Race  White

 

 Ethnic Group  Not  or 









Author







 Organization  [facility]

 

 Address  [address] [place] [address]









Support







 Name  Relationship  Address  Phone

 

 Dexter Quispe  Unavailable  Unavailable  [phone]









Care Team Providers







 Name  Role  Phone

 

 Saud Dumont MD  Primary Care Provider  [phone]









Encounter Details







 Date  Type  Department  Care Team  Description

 

 2019  Letter (Out)  [facility]  Momo Dubon MD



  



     Surgery- [address]



  



     [place] [address]



  



     [address] [place] [address], [address]  [phone]



  



     [address]



  [phone] (Fax)  



     [place] [address] [phone]    







Allergies







 Active Allergy  Reactions  Severity  Noted Date  Comments

 

 Testosterone  Nausea and/or Vomiting    2019  



documented as of this encounter (statuses as of 2019)



Medications







 Medication  Sig  Dispensed  Refills  Start Date  End Date  Status

 

 mupirocin 2 %  Apply  to area(s)  22 g  0  2019    Active



 ointmentIndications:  3 (three) times          



 Paronychia of great  daily.          



 toe, right            



documented as of this encounter (statuses as of 2019)



Active Problems

No known active problemsdocumented as of this encounter (statuses as of 2019)



Social History







 Tobacco Use  Types  Packs/Day  Years Used  Date

 

 Unknown If Ever Smoked        









 Smokeless Tobacco: Never Used      









 Sex Assigned at Birth  Date Recorded

 

 Not on file  









 Job Start Date  Occupation  Industry

 

 Not on file  Not on file  Not on file









 Travel History  Travel Start  Travel End









 No recent travel history available.



documented as of this encounter



Last Filed Vital Signs

Not on filedocumented in this encounter



Plan of Treatment







 Health Maintenance  Due Date  Last Done  Comments

 

 VARICELLA VACCINES (1 of 2 - 13+  1998    



 2-dose series)      

 

 DTaP,Tdap,and Td Vaccines (1 -  2004    



 Tdap)      

 

 INFLUENZA VACCINE (#1)  2019    

 

 PNEUMOCOCCAL 0-64 YEARS COMBINED  Aged Out    No longer eligible based on



 SERIES      patient's age to complete this



       topic



documented as of this encounter



Results

Not on filedocumented in this encounter



Insurance







 Payer  Benefit Plan / Group  Subscriber ID  Effective Dates  Phone  Address  
Type

 

 [facility] NETWORK  [facility] GENERIC  [id number]  2019-Present      PPO



documented as of this encounter

## 2019-09-16 NOTE — XMS REPORT
Patient Summary Document

 Created on:2019



Patient:LORENA MORALES

Sex:Male

:1985

External Reference #:[id number]





Demographics







 Address  [address] [place] [address]

 

 Home Phone  [phone]

 

 Work Phone  [phone]

 

 Email Address  [email or web address]

 

 Preferred Language  Unknown

 

 Marital Status  Unknown

 

 [employer] Affiliation  Unknown

 

 Race  Unknown

 

 Additional Race(s)  Unavailable

 

 Ethnic Group  Unknown









Author







 Organization  [facility]

 

 Address  [address] [place] [address] [address]. [address]le[address]illo. [address] [place] [address]

 

 Phone  [phone]









Care Team Providers







 Name  Role  Phone

 

 Unavailable  Unavailable  Unavailable









Problems

This patient has no known problems.



Allergies, Adverse Reactions, Alerts

This patient has no known allergies or adverse reactions.



Medications

This patient has no known medications.